# Patient Record
Sex: FEMALE | Race: WHITE | URBAN - METROPOLITAN AREA
[De-identification: names, ages, dates, MRNs, and addresses within clinical notes are randomized per-mention and may not be internally consistent; named-entity substitution may affect disease eponyms.]

---

## 2018-04-04 ENCOUNTER — OFFICE VISIT (OUTPATIENT)
Dept: URGENT CARE | Facility: URGENT CARE | Age: 28
End: 2018-04-04
Payer: COMMERCIAL

## 2018-04-04 VITALS
TEMPERATURE: 98.5 F | BODY MASS INDEX: 41.96 KG/M2 | HEART RATE: 100 BPM | DIASTOLIC BLOOD PRESSURE: 96 MMHG | WEIGHT: 228 LBS | SYSTOLIC BLOOD PRESSURE: 138 MMHG | HEIGHT: 62 IN

## 2018-04-04 DIAGNOSIS — M51.369 DDD (DEGENERATIVE DISC DISEASE), LUMBAR: ICD-10-CM

## 2018-04-04 DIAGNOSIS — N39.45 CONTINUOUS LEAKAGE OF URINE: Primary | ICD-10-CM

## 2018-04-04 PROCEDURE — 99203 OFFICE O/P NEW LOW 30 MIN: CPT | Performed by: FAMILY MEDICINE

## 2018-04-04 NOTE — NURSING NOTE
"Chief Complaint   Patient presents with     Urgent Care     herniated disc     Has had herniated disc L4 L5, since last May. Started having some urinary inconitinence. Wider stream. Has been having trouble walking.        Initial BP (!) 138/96  Pulse 100  Temp 98.5  F (36.9  C) (Oral)  Ht 5' 2\" (1.575 m)  Wt 228 lb (103.4 kg)  LMP 03/21/2018  Breastfeeding? No  BMI 41.7 kg/m2 Estimated body mass index is 41.7 kg/(m^2) as calculated from the following:    Height as of this encounter: 5' 2\" (1.575 m).    Weight as of this encounter: 228 lb (103.4 kg).  Medication Reconciliation: complete  "

## 2018-04-04 NOTE — MR AVS SNAPSHOT
"              After Visit Summary   2018    Martine Aragon    MRN: [id number]           Patient Information     Date Of Birth          1990        Visit Information        Provider Department      2018 6:40 PM Jeremy King MD [facility]        Today's Diagnoses     Continuous leakage of urine    -  1    DDD (degenerative disc disease), lumbar           Follow-ups after your visit        Who to contact     If you have questions or need follow up information about today's clinic visit or your schedule please contact [facility] directly at [phone].  Normal or non-critical lab and imaging results will be communicated to you by [employer], letter or phone within 4 business days after the clinic has received the results. If you do not hear from us within 7 days, please contact the clinic through [employer] or phone. If you have a critical or abnormal lab result, we will notify you by phone as soon as possible.  Submit refill requests through [employer] or call your pharmacy and they will forward the refill request to us. Please allow 3 business days for your refill to be completed.          Additional Information About Your Visit        MyChart Information     [employer] lets you send messages to your doctor, view your test results, renew your prescriptions, schedule appointments and more. To sign up, go to [email or web address] . Click on \"Log in\" on the left side of the screen, which will take you to the Welcome page. Then click on \"Sign up Now\" on the right side of the page.     You will be asked to enter the access code listed below, as well as some personal information. Please follow the directions to create your username and password.     Your access code is: [email or web address]  Expires: 7/3/2018  7:07 PM     Your access code will  in 90 days. If you need help or a new code, please call your [facility] clinic or [phone].        Care EveryWhere ID     " "This is your Care EveryWhere ID. This could be used by other organizations to access your [facility] medical records  [id number]        Your Vitals Were     Pulse Temperature Height Last Period Breastfeeding? BMI (Body Mass Index)    100 98.5  F (36.9  C) (Oral) 5' 2\" (1.575 m) 03/21/2018 No 41.7 kg/m2       Blood Pressure from Last 3 Encounters:   04/04/18 (!) 138/96    Weight from Last 3 Encounters:   04/04/18 228 lb (103.4 kg)              Today, you had the following     No orders found for display       Primary Care Provider    Provider Outside       No address on file        Equal Access to Services     [facility]: Hadii aad ku hadasho Soomaali, waaxda luqadaha, qaybta kaalmada adeegyada, saad paynen iraida espinoza . So [facility] [phone].    ATENCIÓN: Si habla español, tiene a fuentes disposición servicios gratuitos de asistencia lingüística. Llame al [phone].    We comply with applicable federal civil rights laws and [place] laws. We do not discriminate on the basis of race, color, national origin, age, disability, sex, sexual orientation, or gender identity.            Thank you!     Thank you for choosing [facility]  for your care. Our goal is always to provide you with excellent care. Hearing back from our patients is one way we can continue to improve our services. Please take a few minutes to complete the written survey that you may receive in the mail after your visit with us. Thank you!             Your Updated Medication List - Protect others around you: Learn how to safely use, store and throw away your medicines at www.disposemymeds.org.          This list is accurate as of 4/4/18  7:07 PM.  Always use your most recent med list.                   Brand Name Dispense Instructions for use Diagnosis    VYVANSE PO             "

## 2018-04-05 NOTE — PROGRESS NOTES
Subjective: Patient has a long history of low back pain starting a year ago down the right leg and she has an MRI scan that was done in February in [place] which she showed me the record of that showed L4-5 disc pathology that was fairly severe.  She has been working with [facility] spine and actually saw them yesterday and they were talking about going ahead and setting up surgical treatment but later that day, yesterday, she realized that she was constantly leaking urine without any awareness.  When she tried to urinate but not much would come out.  She has been having a lot worse weakness and pain in the right leg as well.  She has been through a course of steroids and a steroid injection was done about 9 days ago which actually made things worse.    Objective: Abdomen is grossly benign but difficult to evaluate anything about the bladder.  She is able to walk.  I did not examine her further    Assessment and plan: Worsening symptoms that could indicate need for emergent hospitalization and surgery I recommended she go to the emergency room where they could check bladder with an ultrasound and set up an emergency MRI scan to determine if she needs to be hospitalized tonight.